# Patient Record
Sex: FEMALE | Race: WHITE | NOT HISPANIC OR LATINO | Employment: FULL TIME | URBAN - METROPOLITAN AREA
[De-identification: names, ages, dates, MRNs, and addresses within clinical notes are randomized per-mention and may not be internally consistent; named-entity substitution may affect disease eponyms.]

---

## 2023-12-11 ENCOUNTER — APPOINTMENT (OUTPATIENT)
Dept: OBSTETRICS AND GYNECOLOGY | Facility: CLINIC | Age: 63
End: 2023-12-11
Payer: COMMERCIAL

## 2023-12-11 ENCOUNTER — OFFICE VISIT (OUTPATIENT)
Dept: OBSTETRICS AND GYNECOLOGY | Facility: CLINIC | Age: 63
End: 2023-12-11
Payer: COMMERCIAL

## 2023-12-11 VITALS
WEIGHT: 150 LBS | BODY MASS INDEX: 24.99 KG/M2 | DIASTOLIC BLOOD PRESSURE: 84 MMHG | HEIGHT: 65 IN | SYSTOLIC BLOOD PRESSURE: 176 MMHG

## 2023-12-11 DIAGNOSIS — Z01.419 ENCOUNTER FOR ANNUAL ROUTINE GYNECOLOGICAL EXAMINATION: Primary | ICD-10-CM

## 2023-12-11 DIAGNOSIS — Z12.11 COLON CANCER SCREENING: ICD-10-CM

## 2023-12-11 DIAGNOSIS — Z12.31 SCREENING MAMMOGRAM FOR BREAST CANCER: ICD-10-CM

## 2023-12-11 DIAGNOSIS — Z12.31 SCREENING MAMMOGRAM FOR BREAST CANCER: Primary | ICD-10-CM

## 2023-12-11 PROCEDURE — 1036F TOBACCO NON-USER: CPT | Performed by: OBSTETRICS & GYNECOLOGY

## 2023-12-11 PROCEDURE — 99396 PREV VISIT EST AGE 40-64: CPT | Performed by: OBSTETRICS & GYNECOLOGY

## 2023-12-11 ASSESSMENT — ENCOUNTER SYMPTOMS
COUGH: 0
SHORTNESS OF BREATH: 0
FEVER: 0
CHILLS: 0
FATIGUE: 0
NAUSEA: 0
HEADACHES: 0
COLOR CHANGE: 0
ABDOMINAL PAIN: 0
VOMITING: 0
UNEXPECTED WEIGHT CHANGE: 0
DYSURIA: 0
DIZZINESS: 0

## 2023-12-11 ASSESSMENT — PATIENT HEALTH QUESTIONNAIRE - PHQ9
1. LITTLE INTEREST OR PLEASURE IN DOING THINGS: NOT AT ALL
SUM OF ALL RESPONSES TO PHQ9 QUESTIONS 1 & 2: 0
2. FEELING DOWN, DEPRESSED OR HOPELESS: NOT AT ALL

## 2023-12-11 ASSESSMENT — LIFESTYLE VARIABLES
HOW OFTEN DO YOU HAVE SIX OR MORE DRINKS ON ONE OCCASION: NEVER
SKIP TO QUESTIONS 9-10: 1
AUDIT-C TOTAL SCORE: 1
HOW MANY STANDARD DRINKS CONTAINING ALCOHOL DO YOU HAVE ON A TYPICAL DAY: 1 OR 2
HOW OFTEN DO YOU HAVE A DRINK CONTAINING ALCOHOL: MONTHLY OR LESS

## 2023-12-11 ASSESSMENT — PAIN SCALES - GENERAL: PAINLEVEL: 0-NO PAIN

## 2023-12-11 NOTE — PROGRESS NOTES
"Annual-menopause  Subjective   Teresa Jarvis is a 63 y.o. female who is here for a routine exam.     Complaints: none; pts  currently in surgery and pt is anxious about it, doesn't have issues w/bp normally  Periods: menopausal  Pain: no    HRT: no  History of abnormal Pap smear: no  History of abnormal mammogram: no      OB History          2    Para   2    Term   2            AB        Living   2         SAB        IAB        Ectopic        Multiple        Live Births                      Review of Systems   Constitutional:  Negative for chills, fatigue, fever and unexpected weight change.   Respiratory:  Negative for cough and shortness of breath.    Gastrointestinal:  Negative for abdominal pain, nausea and vomiting.   Genitourinary:  Negative for dyspareunia, dysuria, pelvic pain and vaginal discharge.   Skin:  Negative for color change and rash.   Neurological:  Negative for dizziness and headaches.         Objective   /84   Ht 1.651 m (5' 5\")   Wt 68 kg (150 lb)   BMI 24.96 kg/m²        General:   Alert and oriented, in no acute distress   Neck: Supple. No visible thyromegaly.    Breast/Axilla: Normal to palpation bilaterally without masses, skin changes, or nipple discharge.    Abdomen: Soft, non-tender, without masses or organomegaly   Vulva: Normal architecture without erythema, masses, or lesions.    Vagina: Normal mucosa without lesions, masses, or atrophy. No abnormal vaginal discharge.    Cervix: Normal without masses, lesions, or signs of cervicitis.    Uterus: Normal mobile, non-enlarged uterus    Adnexa: Normal without masses or lesions   Pelvic Floor No POP noted. No high tone pelvic floor    Psych  Rectal Normal affect. Normal mood.   Normal stool, no masses, guaiac negative     Assessment/Plan   Problem List Items Addressed This Visit    None  Visit Diagnoses         Codes    Encounter for annual routine gynecological examination    -  Primary Z01.419    Screening " mammogram for breast cancer     Z12.31    Relevant Orders    BI mammo bilateral screening tomosynthesis    Colon cancer screening     Z12.11    Relevant Orders    Referral to Gastroenterology          Elevated bp- noted today, repeat still elevated, pt aware of risk of untreated HTN, pt denies h/o prob, encouraged pt to monitor and make appt w/pcp  Routine annual  Pap due 2024  Mammogram today  Colonoscopy: referral given      Luzi Jack MD

## 2023-12-12 ENCOUNTER — HOSPITAL ENCOUNTER (OUTPATIENT)
Dept: RADIOLOGY | Facility: HOSPITAL | Age: 63
Discharge: HOME | End: 2023-12-12
Payer: COMMERCIAL

## 2023-12-12 VITALS — BODY MASS INDEX: 24.99 KG/M2 | WEIGHT: 150 LBS | HEIGHT: 65 IN

## 2023-12-12 DIAGNOSIS — Z12.31 SCREENING MAMMOGRAM FOR BREAST CANCER: ICD-10-CM

## 2023-12-12 PROCEDURE — 77063 BREAST TOMOSYNTHESIS BI: CPT

## 2023-12-26 ENCOUNTER — TELEPHONE (OUTPATIENT)
Dept: OBSTETRICS AND GYNECOLOGY | Facility: CLINIC | Age: 63
End: 2023-12-26
Payer: COMMERCIAL

## 2023-12-26 DIAGNOSIS — N76.4 VULVAR BOIL: Primary | ICD-10-CM

## 2023-12-26 RX ORDER — SULFAMETHOXAZOLE AND TRIMETHOPRIM 800; 160 MG/1; MG/1
1 TABLET ORAL 2 TIMES DAILY
Qty: 14 TABLET | Refills: 0 | Status: SHIPPED | OUTPATIENT
Start: 2023-12-26 | End: 2024-01-02

## 2023-12-26 NOTE — TELEPHONE ENCOUNTER
Pt calling stating that she has a what she thought was an ingrown hair that is very sensitive and painful. Pt states it has been draining for a week and would like an atb. Please advise.

## 2024-01-16 RX ORDER — GLUCOSAMINE HCL 500 MG
TABLET ORAL
COMMUNITY

## 2024-01-25 NOTE — PROGRESS NOTES
HPI:  pt here for routine wellness exam w/o any issues    PMH:   Past Medical History:   Diagnosis Date    Ovarian cyst      MEDICATIONS:   Current Outpatient Medications   Medication Sig Dispense Refill    cranberry fruit 400 mg tablet Take by mouth.      MULTIVITAMIN ORAL Take by mouth.       No current facility-administered medications for this visit.     ALLERGIES:  No Known Allergies  SOCIAL HX:    Social History     Tobacco Use    Smoking status: Former     Types: Cigarettes    Smokeless tobacco: Never   Vaping Use    Vaping Use: Never used   Substance Use Topics    Alcohol use: Yes     Alcohol/week: 1.0 standard drink of alcohol     Types: 1 Glasses of wine per week    Drug use: Never     FAMILY HX:   Family History   Problem Relation Name Age of Onset    Breast cancer Mother  65    Bone cancer Father 92 yo     Dementia Father 92 yo     No Known Problems Sister      No Known Problems Brother         ROS:             CONSTITUTIONAL:          Negative for concerns, fever, chills.         HEENT:          no Difficulty swallowing.  no Hearing loss.  no Poor sense of smell.   No change in vision ; no headaches     CARDIOLOGY:          Chest pain none.  Palpitations none.  Shortness of breath none.         RESPIRATORY:          Negative for persistent cough , wheezing, trouble breathing.         GASTROENTEROLOGY:          Negative for abdominal pain, change in bowel habits.         ENDOCRINOLOGY:          Negative for fatigue, polydipsia, polyuria, weight loss, weight gain, cold intolerance, heat intolerance.         MUSCULOSKELETAL:          Negative for myalgias, joint pain.         DERMATOLOGY:          Negative for rash, bruising, moles.         NEUROLOGY:          Negative for weakness, headache, dizziness.     VITALS: /86   Pulse 76   Wt 69.9 kg (154 lb 3.2 oz)   BMI 25.66 kg/m²      PE:  General Appearance: awake, alert, oriented, in no acute distress  Skin: there are no suspicious lesions or rashes  of concern  Head/Face: NCAT  Eyes: No gross abnormalities., PERRL, and EOMI  Ears: canals and TMs NI  Mouth/Throat: Mucosa moist, no lesions; pharynx without erythema, edema or exudate.  Neck: neck- supple, no mass, non-tender  Lungs: Normal expansion.  Clear to auscultation.  No rales, rhonchi, or wheezing.  Heart: Heart sounds are normal.  Regular rate and rhythm without murmur, gallop or rub.  Abdomen: Soft, non-tender, normal bowel sounds; no bruits, organomegaly or masses.  Extremities: Extremities warm to touch, pink, with no edema.  Neurologic: Alert and oriented x 3, gait normal., reflexes normal and symmetric, strength and  sensation grossly normal    Teresa was seen today for establish care.  Diagnoses and all orders for this visit:  Well adult exam (Primary)  -     CBC; Future  -     Comprehensive Metabolic Panel; Future  -     Lipid Panel; Future  -     TSH with reflex to Free T4 if abnormal; Future  Need for vaccination  -     Tdap vaccine, age 7 years and older  (BOOSTRIX)    Pt will call her previous GI and see if they take her new insurance for a colonoscopy.  If not will do FIT test that was given to her today at [facility]

## 2024-02-02 ASSESSMENT — PROMIS GLOBAL HEALTH SCALE
RATE_GENERAL_HEALTH: VERY GOOD
RATE_PHYSICAL_HEALTH: VERY GOOD
RATE_MENTAL_HEALTH: VERY GOOD
EMOTIONAL_PROBLEMS: NEVER
CARRYOUT_PHYSICAL_ACTIVITIES: COMPLETELY
RATE_SOCIAL_SATISFACTION: VERY GOOD
RATE_QUALITY_OF_LIFE: VERY GOOD
CARRYOUT_SOCIAL_ACTIVITIES: VERY GOOD
RATE_AVERAGE_FATIGUE: MILD
RATE_AVERAGE_PAIN: 0

## 2024-02-06 ENCOUNTER — LAB (OUTPATIENT)
Dept: LAB | Facility: LAB | Age: 64
End: 2024-02-06
Payer: COMMERCIAL

## 2024-02-06 ENCOUNTER — OFFICE VISIT (OUTPATIENT)
Dept: PRIMARY CARE | Facility: CLINIC | Age: 64
End: 2024-02-06
Payer: COMMERCIAL

## 2024-02-06 VITALS
WEIGHT: 154.2 LBS | HEART RATE: 76 BPM | DIASTOLIC BLOOD PRESSURE: 86 MMHG | SYSTOLIC BLOOD PRESSURE: 140 MMHG | BODY MASS INDEX: 25.66 KG/M2

## 2024-02-06 DIAGNOSIS — Z23 NEED FOR VACCINATION: ICD-10-CM

## 2024-02-06 DIAGNOSIS — Z00.00 WELL ADULT EXAM: Primary | ICD-10-CM

## 2024-02-06 DIAGNOSIS — Z00.00 WELL ADULT EXAM: ICD-10-CM

## 2024-02-06 LAB
ALBUMIN SERPL BCP-MCNC: 4.4 G/DL (ref 3.4–5)
ALP SERPL-CCNC: 107 U/L (ref 33–136)
ALT SERPL W P-5'-P-CCNC: 14 U/L (ref 7–45)
ANION GAP SERPL CALC-SCNC: 10 MMOL/L (ref 10–20)
AST SERPL W P-5'-P-CCNC: 18 U/L (ref 9–39)
BILIRUB SERPL-MCNC: 0.4 MG/DL (ref 0–1.2)
BUN SERPL-MCNC: 12 MG/DL (ref 6–23)
CALCIUM SERPL-MCNC: 9.7 MG/DL (ref 8.6–10.3)
CHLORIDE SERPL-SCNC: 105 MMOL/L (ref 98–107)
CHOLEST SERPL-MCNC: 185 MG/DL (ref 0–199)
CHOLESTEROL/HDL RATIO: 2.2
CO2 SERPL-SCNC: 30 MMOL/L (ref 21–32)
CREAT SERPL-MCNC: 0.68 MG/DL (ref 0.5–1.05)
EGFRCR SERPLBLD CKD-EPI 2021: >90 ML/MIN/1.73M*2
ERYTHROCYTE [DISTWIDTH] IN BLOOD BY AUTOMATED COUNT: 12.1 % (ref 11.5–14.5)
GLUCOSE SERPL-MCNC: 90 MG/DL (ref 74–99)
HCT VFR BLD AUTO: 39.4 % (ref 36–46)
HDLC SERPL-MCNC: 83.1 MG/DL
HGB BLD-MCNC: 12.8 G/DL (ref 12–16)
LDLC SERPL CALC-MCNC: 84 MG/DL
MCH RBC QN AUTO: 30.4 PG (ref 26–34)
MCHC RBC AUTO-ENTMCNC: 32.5 G/DL (ref 32–36)
MCV RBC AUTO: 94 FL (ref 80–100)
NON HDL CHOLESTEROL: 102 MG/DL (ref 0–149)
NRBC BLD-RTO: 0 /100 WBCS (ref 0–0)
PLATELET # BLD AUTO: 277 X10*3/UL (ref 150–450)
POTASSIUM SERPL-SCNC: 3.9 MMOL/L (ref 3.5–5.3)
PROT SERPL-MCNC: 7.1 G/DL (ref 6.4–8.2)
RBC # BLD AUTO: 4.21 X10*6/UL (ref 4–5.2)
SODIUM SERPL-SCNC: 141 MMOL/L (ref 136–145)
TRIGL SERPL-MCNC: 90 MG/DL (ref 0–149)
TSH SERPL-ACNC: 0.82 MIU/L (ref 0.44–3.98)
VLDL: 18 MG/DL (ref 0–40)
WBC # BLD AUTO: 8.4 X10*3/UL (ref 4.4–11.3)

## 2024-02-06 PROCEDURE — 1036F TOBACCO NON-USER: CPT | Performed by: FAMILY MEDICINE

## 2024-02-06 PROCEDURE — 80061 LIPID PANEL: CPT

## 2024-02-06 PROCEDURE — 36415 COLL VENOUS BLD VENIPUNCTURE: CPT

## 2024-02-06 PROCEDURE — 84443 ASSAY THYROID STIM HORMONE: CPT

## 2024-02-06 PROCEDURE — 99396 PREV VISIT EST AGE 40-64: CPT | Performed by: FAMILY MEDICINE

## 2024-02-06 PROCEDURE — 90715 TDAP VACCINE 7 YRS/> IM: CPT | Performed by: FAMILY MEDICINE

## 2024-02-06 PROCEDURE — 80053 COMPREHEN METABOLIC PANEL: CPT

## 2024-02-06 PROCEDURE — 85027 COMPLETE CBC AUTOMATED: CPT

## 2024-02-06 ASSESSMENT — PATIENT HEALTH QUESTIONNAIRE - PHQ9
1. LITTLE INTEREST OR PLEASURE IN DOING THINGS: NOT AT ALL
2. FEELING DOWN, DEPRESSED OR HOPELESS: NOT AT ALL
SUM OF ALL RESPONSES TO PHQ9 QUESTIONS 1 AND 2: 0

## 2024-02-06 ASSESSMENT — PAIN SCALES - GENERAL: PAINLEVEL: 0-NO PAIN

## 2024-02-06 NOTE — PATIENT INSTRUCTIONS
Check your blood pressures at home.  You want <140/90 consistently.  Check a few times in one sitting a few minutes apart.

## 2024-02-09 ENCOUNTER — LAB (OUTPATIENT)
Dept: LAB | Facility: LAB | Age: 64
End: 2024-02-09
Payer: COMMERCIAL

## 2024-02-09 DIAGNOSIS — Z12.11 ENCOUNTER FOR SCREENING FOR MALIGNANT NEOPLASM OF COLON: Primary | ICD-10-CM

## 2024-02-09 PROCEDURE — 82274 ASSAY TEST FOR BLOOD FECAL: CPT

## 2024-02-13 LAB — HEMOCCULT STL QL IA: NEGATIVE

## 2024-04-08 ENCOUNTER — APPOINTMENT (OUTPATIENT)
Dept: PRIMARY CARE | Facility: CLINIC | Age: 64
End: 2024-04-08
Payer: COMMERCIAL

## 2024-04-11 ENCOUNTER — APPOINTMENT (OUTPATIENT)
Dept: PRIMARY CARE | Facility: CLINIC | Age: 64
End: 2024-04-11
Payer: COMMERCIAL

## 2024-07-05 ENCOUNTER — TELEPHONE (OUTPATIENT)
Dept: OBSTETRICS AND GYNECOLOGY | Facility: CLINIC | Age: 64
End: 2024-07-05
Payer: COMMERCIAL

## 2024-07-05 DIAGNOSIS — Z12.31 SCREENING MAMMOGRAM FOR BREAST CANCER: Primary | ICD-10-CM

## 2024-11-11 ENCOUNTER — OFFICE VISIT (OUTPATIENT)
Dept: OBSTETRICS AND GYNECOLOGY | Facility: CLINIC | Age: 64
End: 2024-11-11
Payer: COMMERCIAL

## 2024-11-11 VITALS
SYSTOLIC BLOOD PRESSURE: 168 MMHG | BODY MASS INDEX: 25.19 KG/M2 | DIASTOLIC BLOOD PRESSURE: 89 MMHG | WEIGHT: 151.2 LBS | HEIGHT: 65 IN

## 2024-11-11 DIAGNOSIS — Z11.51 ENCOUNTER FOR SCREENING FOR HUMAN PAPILLOMAVIRUS (HPV): ICD-10-CM

## 2024-11-11 DIAGNOSIS — Z01.419 ENCOUNTER FOR ANNUAL ROUTINE GYNECOLOGICAL EXAMINATION: Primary | ICD-10-CM

## 2024-11-11 PROCEDURE — 99396 PREV VISIT EST AGE 40-64: CPT | Performed by: OBSTETRICS & GYNECOLOGY

## 2024-11-11 PROCEDURE — 3008F BODY MASS INDEX DOCD: CPT | Performed by: OBSTETRICS & GYNECOLOGY

## 2024-11-11 ASSESSMENT — LIFESTYLE VARIABLES
HOW OFTEN DO YOU HAVE SIX OR MORE DRINKS ON ONE OCCASION: LESS THAN MONTHLY
SKIP TO QUESTIONS 9-10: 0
AUDIT-C TOTAL SCORE: 2
HOW OFTEN DO YOU HAVE A DRINK CONTAINING ALCOHOL: MONTHLY OR LESS
HOW MANY STANDARD DRINKS CONTAINING ALCOHOL DO YOU HAVE ON A TYPICAL DAY: 1 OR 2

## 2024-11-11 ASSESSMENT — ENCOUNTER SYMPTOMS
DEPRESSION: 0
LOSS OF SENSATION IN FEET: 0
OCCASIONAL FEELINGS OF UNSTEADINESS: 0

## 2024-11-11 ASSESSMENT — SOCIAL DETERMINANTS OF HEALTH (SDOH)
WITHIN THE LAST YEAR, HAVE YOU BEEN KICKED, HIT, SLAPPED, OR OTHERWISE PHYSICALLY HURT BY YOUR PARTNER OR EX-PARTNER?: NO
WITHIN THE LAST YEAR, HAVE YOU BEEN AFRAID OF YOUR PARTNER OR EX-PARTNER?: NO
WITHIN THE LAST YEAR, HAVE YOU BEEN HUMILIATED OR EMOTIONALLY ABUSED IN OTHER WAYS BY YOUR PARTNER OR EX-PARTNER?: NO
WITHIN THE LAST YEAR, HAVE TO BEEN RAPED OR FORCED TO HAVE ANY KIND OF SEXUAL ACTIVITY BY YOUR PARTNER OR EX-PARTNER?: NO

## 2024-11-11 ASSESSMENT — PATIENT HEALTH QUESTIONNAIRE - PHQ9
2. FEELING DOWN, DEPRESSED OR HOPELESS: NOT AT ALL
1. LITTLE INTEREST OR PLEASURE IN DOING THINGS: NOT AT ALL
SUM OF ALL RESPONSES TO PHQ9 QUESTIONS 1 & 2: 0

## 2024-11-11 ASSESSMENT — PAIN SCALES - GENERAL: PAINLEVEL_OUTOF10: 0-NO PAIN

## 2024-11-11 NOTE — PROGRESS NOTES
"Annual-menopause  Subjective   HPI:  Teresa Jarvis is a 64 y.o. female  No LMP recorded. (Menstrual status: Menopausal). for annual exam.    Complaints:   none  Periods: menopausal  Pain:    none    GYNH:   HRT: no  Last pap    History of abnormal Pap smear:   none  Last mammogram  2023  History of abnormal mammogram:   none  Colonoscopy declined; fit testing done 2024       OB History          2    Para   2    Term   2            AB        Living   2         SAB        IAB        Ectopic        Multiple        Live Births                      Past Medical History:   Diagnosis Date    Ovarian cyst        Past Surgical History:   Procedure Laterality Date    APPENDECTOMY      20 yo    DILATION AND CURETTAGE OF UTERUS         Prior to Admission medications    Medication Sig Start Date End Date Taking? Authorizing Provider   cranberry fruit 400 mg tablet Take by mouth.   Yes Historical Provider, MD   MULTIVITAMIN ORAL Take by mouth. 23  Yes Historical Provider, MD       Social History     Tobacco Use    Smoking status: Former     Types: Cigarettes    Smokeless tobacco: Never   Vaping Use    Vaping status: Never Used   Substance Use Topics    Alcohol use: Yes     Alcohol/week: 1.0 standard drink of alcohol     Types: 1 Glasses of wine per week    Drug use: Never        Objective   /89   Ht 1.651 m (5' 5\")   Wt 68.6 kg (151 lb 3.2 oz)   BMI 25.16 kg/m²        General:   Alert and oriented, in no acute distress   Neck: Supple. No visible thyromegaly.    Breast/Axilla: Normal to palpation bilaterally without masses, skin changes, or nipple discharge.    Abdomen: Soft, non-tender, without masses or organomegaly   Vulva: Normal architecture without erythema, masses, or lesions.    Vagina: Normal mucosa without lesions, masses.  Positive atrophy. No abnormal vaginal discharge.    Cervix: Normal without masses, lesions, or signs of cervicitis.    Uterus: Normal mobile, non-enlarged " uterus    Adnexa: Normal without masses or lesions   Pelvic Floor No POP noted. No high tone pelvic floor    Psych  Rectal Normal affect. Normal mood.   No masses     Assessment/Plan   Problem List Items Addressed This Visit    None  Visit Diagnoses         Codes    Encounter for annual routine gynecological examination    -  Primary Z01.419    Relevant Orders    THINPREP PAP TEST    Encounter for screening for human papillomavirus (HPV)     Z11.51    Relevant Orders    THINPREP PAP TEST         Encouraged keeping bp log and meeting w/pcp again as bp elelvated  Routine annual  OB/GYN Preventive:     - Pap smear indicated every 3-5 years if normal and otherwise low risk   - Self breast exam monthly and clinical breast examination/mammogram yearly   - Screening colonoscopy recommended starting age 45, then Q3-10 years depending on testing and family history ; fit testing done 2/2024  - Osteoporosis prevention discussion included vit D3/calcium supplements, weight-bearing exercise, DEXA n/a  - Genitourinary skin hygiene discussed.  - Diet/Weight management discussed.  - Exercise 30-60 minutes 3-5 times/day    Luiz Jack MD

## 2024-11-20 LAB
CYTOLOGY CMNT CVX/VAG CYTO-IMP: NORMAL
HPV HR 12 DNA GENITAL QL NAA+PROBE: NEGATIVE
HPV HR GENOTYPES PNL CVX NAA+PROBE: NEGATIVE
HPV16 DNA SPEC QL NAA+PROBE: NEGATIVE
HPV18 DNA SPEC QL NAA+PROBE: NEGATIVE
LAB AP HPV GENOTYPE QUESTION: YES
LAB AP HPV HR: NORMAL
LABORATORY COMMENT REPORT: NORMAL
PATH REPORT.TOTAL CANCER: NORMAL

## 2024-12-12 ENCOUNTER — APPOINTMENT (OUTPATIENT)
Dept: RADIOLOGY | Facility: HOSPITAL | Age: 64
End: 2024-12-12
Payer: COMMERCIAL

## 2024-12-16 ENCOUNTER — HOSPITAL ENCOUNTER (OUTPATIENT)
Dept: RADIOLOGY | Facility: HOSPITAL | Age: 64
Discharge: HOME | End: 2024-12-16
Payer: COMMERCIAL

## 2024-12-16 VITALS — HEIGHT: 64 IN | WEIGHT: 150 LBS | BODY MASS INDEX: 25.61 KG/M2

## 2024-12-16 DIAGNOSIS — Z12.31 SCREENING MAMMOGRAM FOR BREAST CANCER: ICD-10-CM

## 2024-12-16 PROCEDURE — 77067 SCR MAMMO BI INCL CAD: CPT | Performed by: STUDENT IN AN ORGANIZED HEALTH CARE EDUCATION/TRAINING PROGRAM

## 2024-12-16 PROCEDURE — 77067 SCR MAMMO BI INCL CAD: CPT

## 2024-12-16 PROCEDURE — 77063 BREAST TOMOSYNTHESIS BI: CPT | Performed by: STUDENT IN AN ORGANIZED HEALTH CARE EDUCATION/TRAINING PROGRAM

## 2025-03-07 ENCOUNTER — TELEPHONE (OUTPATIENT)
Dept: OBSTETRICS AND GYNECOLOGY | Facility: CLINIC | Age: 65
End: 2025-03-07
Payer: COMMERCIAL

## 2025-03-07 DIAGNOSIS — Z12.11 COLON CANCER SCREENING: Primary | ICD-10-CM

## 2025-03-12 ENCOUNTER — TELEPHONE (OUTPATIENT)
Dept: SURGERY | Facility: CLINIC | Age: 65
End: 2025-03-12
Payer: COMMERCIAL

## 2025-03-12 NOTE — TELEPHONE ENCOUNTER
LM on pt's vmail to return the call back to the office.   Pt is scheduled for the Colonoscopy phone screening appt at 0900.

## 2025-03-26 ENCOUNTER — TELEMEDICINE CLINICAL SUPPORT (OUTPATIENT)
Dept: SURGERY | Facility: CLINIC | Age: 65
End: 2025-03-26
Payer: COMMERCIAL

## 2025-03-26 ENCOUNTER — TELEPHONE (OUTPATIENT)
Dept: SURGERY | Facility: CLINIC | Age: 65
End: 2025-03-26

## 2025-03-26 VITALS — HEIGHT: 65 IN | WEIGHT: 149 LBS | BODY MASS INDEX: 24.83 KG/M2

## 2025-03-26 DIAGNOSIS — Z12.11 COLON CANCER SCREENING: ICD-10-CM

## 2025-03-26 RX ORDER — MULTIVIT WITH IRON,MINERALS
1 TABLET ORAL DAILY
COMMUNITY

## 2025-03-26 RX ORDER — VIT C/E/ZN/COPPR/LUTEIN/ZEAXAN 250MG-90MG
400 CAPSULE ORAL DAILY
COMMUNITY

## 2025-03-26 ASSESSMENT — PAIN SCALES - GENERAL: PAINLEVEL_OUTOF10: 0-NO PAIN

## 2025-03-26 ASSESSMENT — PATIENT HEALTH QUESTIONNAIRE - PHQ9
1. LITTLE INTEREST OR PLEASURE IN DOING THINGS: NOT AT ALL
SUM OF ALL RESPONSES TO PHQ9 QUESTIONS 1 AND 2: 0
2. FEELING DOWN, DEPRESSED OR HOPELESS: NOT AT ALL

## 2025-03-26 NOTE — TELEPHONE ENCOUNTER
Alice Mcknight RN  [facility] [address] [place] [address] Clinical Support Staff  Pls schedule pt for screening Colonoscopy with Dr. Henriquez at [facility]. Surgery itinerary/bowel prep instructions reviewed with pt and will need to be mailed to the pt's home address.

## 2025-03-26 NOTE — PROGRESS NOTES
This is a 65 year old female who presents for telemedicine visit via telephone to discuss screening colonoscopy referred by Dr. Jack  for Dr. Henriquez.  Patient denies states she had a Colonoscopy done over 10 yrs ago at [facility].     Patient denies change in bowel habits, diarrhea, constipation, change in caliber of the stool, blood or mucous in stool, rectal pain, fevers, unintentional weight loss. Patient also denies issues with abdominal pain, nausea, vomiting, acid reflux, indigestion.    Patient denies a family history of colorectal cancer, colon polyps, IBD, other gastro intestinal issues. Patient denies use of blood thinners, NSAIDs. Patient denies use of tobacco, other drug use. Social alcohol use. All pertinent medical history, surgical history, social and family history were reviewed and updated with the patient.    Patient denies taking Glucagon-like peptide-1 (GLP-1) Ozempic, Saxenda, Mounjaro, Tazeum, Trulicity, Lyxumia, Byetta, Byoureon. Patient advised that these medications are used for Type 2 diabetes but more so recently for weight loss management. One side effect of this medication is that it inhibits gastric emptying therefor causing much higher risk of aspirating and desaturating.    Patient is unable to be physically examined due to telephone visit but denies chest pain, shortness of breath, abdominal pain or tenderness, skin abnormalities. Denies knowledge of bright red blood per rectum or hemorrhoids.    Risks, benefits, and alternatives to colonoscopy were discussed. Alternatives including colo guard and FIT testing were discussed as well as their inability to remove polyps that were detected. Patient understands that a bowel prep must be completed for adequate evaluation of the colon, and inadequate prep may allow missed lesions. Patient understands a risk of perforation is less than 1 in 5000 colonoscopy, risk of serious bleeding or abdominal pain is less than 1%. Patient agrees to  proceed with colonoscopy with possible biopsy.    Bowel prep instructions were discussed in detail with patient and reviewed thoroughly. Bowel prep instructions will be mailed to patient's home address on file or sent to the patient's MyChart.  Colonoscopy procedure, risks, and benefits were explained and reviewed with patient. All questions and concerns were addressed this visit.    Patient to be scheduled for screening colonoscopy under MAC with Dr. Henriquez at [facility].  Over 30 minutes was spent on this patient counter including televisit, patient counseling and education, assessment and plan, reviewing necessary labs and diagnostics, and discussing pertinent education.      Counseling:  Medication education:  Education:  Current Medication list reviewed and discussed, Understanding:  Patient expressed understanding, Adherence:  No barriers to adherence identified, Education:  Current Medication list reviewed and discussed, Understanding:  Patient expressed understanding, Adherence:  No barriers to adherence identified.     Order placed in Epic for Procedure:Yes    Itinerary and Prep instructions sent: Will need to be mailed to the pt's home address  If self-pay, Estimate letter sent: N/A      Last EGD:Never  Last COLON:Over 10 yrs ago per pt    Do you have a Family history of colon cancer &/or colon polyps?No  Do you have any GI symptoms you are concerned about? No      Decision Tree Answer From: REQUIRED to complete    What is the patient’ sedation requirement? MAC    Patient location for procedure: [facility]    What is your height?5'5    What is your weight? 149lbs    What is your BMI? 24.79         Do you have breathing problems? No    Do you have Sleep Apnea? No    Home oxygen use? No         Are you non-ambulatory? No    Do you have severe anxiety or take pain medication on a regular basis? No    Recreational Drug Use? No          Are you an insulin dependent diabetic? No    Diabetic medications  adjusted: No         Are you currently taking any blood thinners? No    Do you have a History of Heart failure or mechanical valves? No  Have you had a stroke (CVA or TIA) in the last 3 months? No    Do you have a Defibrillator (AICD) or Pacemaker & what is the date of last interrogation? No    Are you currently on dialysis? No

## 2025-03-26 NOTE — Clinical Note
Pls schedule pt for screening Colonoscopy with Dr. Henriquez at [facility]. Surgery itinerary/bowel prep instructions reviewed with pt and will need to be mailed to the pt's home address.

## 2025-03-26 NOTE — TELEPHONE ENCOUNTER
Patient was scheduled for colonoscopy on 4/29/2025. Patient will be sent instructions through the mail. OR schedulers were notified via secure chat.    Ruth Johnson CMA.

## 2025-03-26 NOTE — PATIENT INSTRUCTIONS
Please read the following immediately.   A Colonoscopy has been recommended to you.   This is an examination of your large intestine with a lighted flexible tube. You will be given sedative medication through an intravenous catheter and then the physician will pass the flexible tube into the rectum and through your entire large intestines. If you have a polyp (abnormal growth of tissue) it will be removed during the procedure. The physician may also take biopsies (small pieces of tissue for microscopic examination). The procedure will take about 45-60 minutes to complete and then you will rest in recovery for an additional 30-60 minutes while the sedation wears off.     Your colonoscopy will take place at: [address]. [facility], [address] [address] [facility].     PURCHASE the following:        · Miralax (Glycolax) 8.3 oz (238 gm bottle) ?   ? Bisacodyl (Dulcolax laxative) 5mg   4 tablets - NOT suppositories   ? 64 oz of Gatorade or any non-carbonated clear liquid (Iced Tea, Crystal Light) Select green, yellow, or clear flavors (NO PURPLE OR RED)     If you take medication to thin your blood, such as Coumadin (Warfarin), Plavix (Clopidogrel), Xarelto (rivaroxaban) or Pradaxa (Dabigatran), Eliquis (Apizaban), Aggrenox (Aspirin/Dipyridamole) etc., ask the doctor that prescribed it for instructions prior to stopping. A baby Aspirin (81mg) may be continued.   STOP all fiber supplements or medications containing Iron 7 days prior to procedure.   Confirm that you have a  for the day of the procedure. You are not allowed to drive for 24 hours after your procedure.              ONE DAY BEFORE PROCEDURE    NO SOLID FOODS / NO ALCOHOL     Surgery scheduling department will call you in the afternoon the day before and let you know   the time of arrival for your procedure, if they have not called by 3pm please call [phone].     Clear liquids only ALL DAY     ? AVOID anything red or purple   ? NO milk products or non-dairy  creamer.   ? SEE LIST OF CLEAR LIQUIDS SUGGESTIONS   ? Speak with your primary care physician about your diabetic medications     STARTING PREP: DAY BEFORE COLONOSCOPY     ? 5:00 PM: Mix the 8.3 oz bottle of Miralax in 64 oz of Gatorade or chosen clear liquid.   ? Drink 32 oz (1/2 of the 64 oz bottle) solution at a rate of 8 ounces every 15 min. and take 2 Dulcolax tablets with this. Keep the remaining 32 ounces.   ? Continue with clear liquids until bedtime.     MORNING OF PROCEDURE     5 HOURS PRIOR TO ARRIVAL TIME:     DRINK THE REMAINING 32 OUNCES and 2 Dulcolax tablets.     You may continue to drink clear liquids up to 4 hours prior to the procedure.     Drinking liquids after this will cause us to cancel or postpone your procedure     ** Also no gum, hard candy, mints and tobacco products in these 4 hours.     Not stopping your blood thinner in time as directed by your physician will cause us to cancel and reschedule your procedure.      DIABETICS: DO NOT take oral medication   DIABETA, GLUCOPHAGE   METFORMIN or JANUVIA     - If you are Insulin dependent, do not take your morning dose of insulin.   All patients may take morning medications with sips of water.          SUGGESTED CLEAR LIQUID GUIDE INFORMATION   ? Gatorade or Powerade   ? Clear broth or bouillon - chicken or beef   ? Coffee or Tea (no milk or no-dairy creamer)   ? Carbonated and Non-Carbonated Soft Drinks   ? Dexter-Aid or Crystal Light   ? Strained Fruit Juices (no pulp)   ? Jell-O, Popsicles, or Italian Ice     PRODUCTS TO AVOID   ? Cream or non-dairy creamer   ? Orange, Grapefruit or Tomato Juice   ? Creamed Soups or any soup other than broth   ? Oatmeal   ? Cream of Wheat   ? Milk or milkshakes     THINGS TO BRING WITH YOU!   ? A RESPONSIBLE    ? INSURANCE CARDS   ? A PHOTO ID   ? MEDICATION LIST   ? DURABLE POWER OF  AND LIVING WILL

## 2025-04-16 ENCOUNTER — PRE-ADMISSION TESTING (OUTPATIENT)
Dept: PREADMISSION TESTING | Facility: HOSPITAL | Age: 65
End: 2025-04-16
Payer: COMMERCIAL

## 2025-04-16 VITALS
WEIGHT: 152 LBS | RESPIRATION RATE: 16 BRPM | OXYGEN SATURATION: 100 % | TEMPERATURE: 96.6 F | BODY MASS INDEX: 25.33 KG/M2 | HEART RATE: 69 BPM | DIASTOLIC BLOOD PRESSURE: 74 MMHG | SYSTOLIC BLOOD PRESSURE: 157 MMHG | HEIGHT: 65 IN

## 2025-04-16 DIAGNOSIS — Z01.818 PRE-OP TESTING: Primary | ICD-10-CM

## 2025-04-16 PROCEDURE — 99203 OFFICE O/P NEW LOW 30 MIN: CPT | Performed by: PHYSICIAN ASSISTANT

## 2025-04-16 ASSESSMENT — DUKE ACTIVITY SCORE INDEX (DASI)
DASI METS SCORE: 9
CAN YOU DO HEAVY WORK AROUND THE HOUSE LIKE SCRUBBING FLOORS OR LIFTING AND MOVING HEAVY FURNITURE: YES
CAN YOU TAKE CARE OF YOURSELF (EAT, DRESS, BATHE, OR USE TOILET): YES
TOTAL_SCORE: 50.7
CAN YOU PARTICIPATE IN MODERATE RECREATIONAL ACTIVITIES LIKE GOLF, BOWLING, DANCING, DOUBLES TENNIS OR THROWING A BASEBALL OR FOOTBALL: YES
CAN YOU WALK INDOORS, SUCH AS AROUND YOUR HOUSE: YES
CAN YOU DO MODERATE WORK AROUND THE HOUSE LIKE VACUUMING, SWEEPING FLOORS OR CARRYING GROCERIES: YES
CAN YOU PARTICIPATE IN STRENOUS SPORTS LIKE SWIMMING, SINGLES TENNIS, FOOTBALL, BASKETBALL, OR SKIING: NO
CAN YOU RUN A SHORT DISTANCE: YES
CAN YOU CLIMB A FLIGHT OF STAIRS OR WALK UP A HILL: YES
CAN YOU WALK A BLOCK OR TWO ON LEVEL GROUND: YES
CAN YOU DO LIGHT WORK AROUND THE HOUSE LIKE DUSTING OR WASHING DISHES: YES
CAN YOU DO YARD WORK LIKE RAKING LEAVES, WEEDING OR PUSHING A MOWER: YES
CAN YOU HAVE SEXUAL RELATIONS: YES

## 2025-04-16 ASSESSMENT — ENCOUNTER SYMPTOMS
ROS GI COMMENTS: SEE HPI
ARTHRALGIAS: 1

## 2025-04-16 NOTE — PREPROCEDURE INSTRUCTIONS
PAT DISCHARGE INSTRUCTIONS    Please call the Same Day Surgery (SDS) Department of the hospital where your procedure will be performed after 2:00 PM the day before your surgery. If you are scheduled on a Monday, or a Tuesday following a Monday holiday, you will need to call on the last business day prior to your surgery.    [facility]  [address], [address]  [phone]    [facility]  [address] [place] [address] [phone]    [facility]  [address]  [place] [id number]  [phone]    Please let your surgeon know if:      You develop any open sores, shingles, burning or painful urination as these may increase your risk of an infection.   You no longer wish to have the surgery.   Any other personal circumstances change that may lead to the need to cancel or defer this surgery-such as being sick or getting admitted to any hospital within one week of your planned procedure.    Your contact details change, such as a change of address or phone number.    Starting now:     Please DO NOT drink alcohol or smoke for 24 hours before surgery. It is well known that quitting smoking can make a huge difference to your health and recovery from surgery. The longer you abstain from smoking, the better your chances of a healthy recovery. If you need help with quitting, call 1-800-QUIT-NOW to be connected to a trained counselor who will discuss the best methods to help you quit.     Before your surgery:    Please stop all supplements 7 days prior to surgery. Or as directed by your surgeon.   Please stop taking NSAID pain medicine such as Advil and Motrin 7 days before surgery.    If you develop any fever, cough, cold, rashes, cuts, scratches, scrapes, urinary symptoms or infection anywhere on your body (including teeth and gums) prior to surgery, please call your surgeon’s office as soon as  possible. This may require treatment to reduce the chance of cancellation on the day of surgery.    The day before your surgery:   Get a good night’s rest.  Use the special soap for bathing if you have been instructed to use one.    Scheduled surgery times may change and you will be notified if this occurs - please check your personal voicemail for any updates.     On the morning of surgery:   Wear comfortable, loose fitting clothes which open in the front. Please do not wear moisturizers, creams, lotions, makeup or perfume.    Please bring with you to surgery:   Photo ID and insurance card   Current list of medicines and allergies   Pacemaker/ Defibrillator/Heart stent cards   CPAP machine and mask    Slings/ splints/ crutches   A copy of your complete advanced directive/DHPOA.    Please do NOT bring with you to surgery:   All jewelry and valuables should be left at home.   Prosthetic devices such as contact lenses, hearing aids, dentures, eyelash extensions, hairpins and body piercings must be removed prior to going in to the surgical suite.    After outpatient surgery:   A responsible adult MUST accompany you at the time of discharge and stay with you for 24 hours after your surgery. You may NOT drive yourself home after surgery.    Do not drive, operate machinery, make critical decisions or do activities that require co-ordination or balance until after a night’s sleep.   Do not drink alcoholic beverages for 24 hours.   Instructions for resuming your medications will be provided by your surgeon.    CALL YOUR DOCTOR AFTER SURGERY IF YOU HAVE:     Chills and/or a fever of 101° F or higher.    Redness, swelling, pus or drainage from your surgical wound or a bad smell from the wound.    Lightheadedness, fainting or confusion.    Persistent vomiting (throwing up) and are not able to eat or drink for 12 hours.    Three or more loose, watery bowel movements in 24 hours (diarrhea).   Difficulty or pain while urinating(  after non-urological surgery)    Pain and swelling in your legs, especially if it is only on one side.    Difficulty breathing or are breathing faster than normal.    Any new concerning symptoms.    FOLLOW ALL INSTRUCTIONS REGARDING BOWEL PREP AND EATING/DRINKING GUIDELINES GIVEN TO YOU BY DR RAMON     Medication List            Accurate as of April 16, 2025  2:11 PM. Always use your most recent med list.                cod liver oiL capsule  Additional Medication Adjustments for Surgery: Take last dose 7 days before surgery     MULTIVITAMIN ORAL  Additional Medication Adjustments for Surgery: Take last dose 7 days before surgery     NON FORMULARY  Additional Medication Adjustments for Surgery: Take last dose 7 days before surgery  Notes to patient: UQORA     vitamin E 180 mg (400 units) capsule  Additional Medication Adjustments for Surgery: Take last dose 7 days before surgery

## 2025-04-16 NOTE — CPM/PAT H&P
"CPM/PAT Evaluation       Name: Teresa Jarvis (Teresa Jarvis)  /Age: 1960/65 y.o.     In-Person       Chief Complaint: \"having a colonoscopy\"    HPI  The patient is a 65 year old female.  She had a routine colonoscopy 15 years ago and states this was normal.  Currently she feels well from a gastrointestinal standpoint.  She denies abdominal pain, bloating, cramping, nausea, vomiting, diarrhea, constipation or melena.  Her appetite is good, weight is stable, and she denies a family history of colon cancer.  She was recently referred to Dr. Henriquez by her GYN for a routine colonoscopy.    Past Medical History:   Diagnosis Date    Ovarian cyst        Past Surgical History:   Procedure Laterality Date    APPENDECTOMY      COLONOSCOPY      DILATION AND CURETTAGE OF UTERUS       Family History   Problem Relation Name Age of Onset    Breast cancer Mother  65    Bone cancer Father 92 yo     Dementia Father 92 yo     No Known Problems Sister      No Known Problems Brother       Social History     Tobacco Use    Smoking status: Former     Current packs/day: 0.00     Average packs/day: 0.3 packs/day for 12.0 years (3.0 ttl pk-yrs)     Types: Cigarettes     Start date:      Quit date:      Years since quittin.3    Smokeless tobacco: Never   Substance Use Topics    Alcohol use: Yes     Alcohol/week: 6.0 standard drinks of alcohol     Types: 6 Glasses of wine per week     Social History     Substance and Sexual Activity   Drug Use Never       No Known Allergies    Current Outpatient Medications   Medication Sig Dispense Refill    cod liver oiL capsule Take 1 tablet by mouth once daily.      MULTIVITAMIN ORAL Take by mouth.      NON FORMULARY UQORA- UTI PREVENTION      vitamin E 180 mg (400 unit) capsule Take 1 capsule (400 Units) by mouth once daily.       No current facility-administered medications for this visit.     Review of Systems   Gastrointestinal:         See HPI   Musculoskeletal:  Positive " "for arthralgias.   All other systems reviewed and are negative.    /74   Pulse 69   Temp 35.9 °C (96.6 °F) (Temporal)   Resp 16   Ht 1.651 m (5' 5\")   Wt 68.9 kg (152 lb)   SpO2 100%   BMI 25.29 kg/m²     Physical Exam  Vitals reviewed.   Constitutional:       Appearance: Normal appearance.   HENT:      Head: Normocephalic and atraumatic.      Mouth/Throat:      Mouth: Mucous membranes are moist.      Pharynx: Oropharynx is clear.   Eyes:      Extraocular Movements: Extraocular movements intact.      Pupils: Pupils are equal, round, and reactive to light.   Cardiovascular:      Rate and Rhythm: Normal rate and regular rhythm.   Pulmonary:      Effort: Pulmonary effort is normal.      Breath sounds: Normal breath sounds.   Abdominal:      General: Bowel sounds are normal.      Palpations: Abdomen is soft.   Musculoskeletal:         General: No swelling.   Skin:     General: Skin is warm and dry.   Neurological:      General: No focal deficit present.      Mental Status: She is alert and oriented to person, place, and time.   Psychiatric:         Mood and Affect: Mood normal.         Behavior: Behavior normal.        PAT AIRWAY:   Airway:     Mallampati::  II    TM distance::  >3 FB    Neck ROM::  Full   Teeth intact    ASA:  I  DASI SCORE:  50.7  METS SCORE:  9  CHAD2 SCORE:  1.9%  REVISED CARDIAC RISK INDEX:  0.4%  STOP BANG SCORE:  2  CAPRINI DVT SCORE:  7  ARISCAT SCORE:  1.6%    Assessment and Plan:      H/O colonoscopy 2010:  Scheduled for screening colonoscopy 4/29/2025    Saba Palacios PA-C          "

## 2025-04-29 ENCOUNTER — APPOINTMENT (OUTPATIENT)
Dept: GASTROENTEROLOGY | Facility: HOSPITAL | Age: 65
End: 2025-04-29
Payer: COMMERCIAL

## 2025-05-13 ENCOUNTER — APPOINTMENT (OUTPATIENT)
Dept: SURGERY | Facility: CLINIC | Age: 65
End: 2025-05-13
Payer: COMMERCIAL

## 2025-07-22 NOTE — PROGRESS NOTES
Subjective   Patient ID: Teresa Jarvis is a 65 y.o. female who presents for No chief complaint on file..    HPI   Pt comes in with right hand/arm swelling after being stung by a bee 4 days ago    Medical History[1]  Current Medications[2]    Review of Systems see hpi    Objective   There were no vitals taken for this visit.    Physical Exam    Assessment/Plan   Assessment & Plan                [1]   Past Medical History:  Diagnosis Date    Ovarian cyst    [2]   Current Outpatient Medications   Medication Sig Dispense Refill    cod liver oiL capsule Take 1 tablet by mouth once daily.      MULTIVITAMIN ORAL Take by mouth.      NON FORMULARY UQORA- UTI PREVENTION      vitamin E 180 mg (400 unit) capsule Take 1 capsule (400 Units) by mouth once daily.       No current facility-administered medications for this visit.

## 2025-07-24 ENCOUNTER — APPOINTMENT (OUTPATIENT)
Dept: PRIMARY CARE | Facility: CLINIC | Age: 65
End: 2025-07-24
Payer: COMMERCIAL